# Patient Record
Sex: MALE | Race: WHITE
[De-identification: names, ages, dates, MRNs, and addresses within clinical notes are randomized per-mention and may not be internally consistent; named-entity substitution may affect disease eponyms.]

---

## 2021-02-16 ENCOUNTER — HOSPITAL ENCOUNTER (EMERGENCY)
Dept: HOSPITAL 63 - ER | Age: 46
Discharge: HOME | End: 2021-02-16
Payer: SELF-PAY

## 2021-02-16 DIAGNOSIS — M53.3: ICD-10-CM

## 2021-02-16 DIAGNOSIS — M54.5: ICD-10-CM
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
## 2021-02-16 DIAGNOSIS — M70.22: Primary | ICD-10-CM

## 2021-02-16 PROCEDURE — 72100 X-RAY EXAM L-S SPINE 2/3 VWS: CPT

## 2021-02-16 PROCEDURE — 99284 EMERGENCY DEPT VISIT MOD MDM: CPT

## 2021-02-16 PROCEDURE — 72170 X-RAY EXAM OF PELVIS: CPT

## 2021-02-16 PROCEDURE — 73080 X-RAY EXAM OF ELBOW: CPT
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
## 2021-02-16 NOTE — RAD
XR LUMBAR SPINE 2-3V 2/16/2021 9:00 AM



Indication: Back pain



COMPARISON: None available



TECHNIQUE: 3 views of the lumbar spine are provided.



Findings: 



Alignment of the lumbar spine is normal. Vertebral body heights are maintained. No acute fracture is 
identified.



Disc heights are maintained. No significant endplate degenerative changes are identified. There is no
 significant facet arthropathy. No significant osseous neuroforaminal stenosis or spinal canal stenos
is.



Nonobstructive bowel gas pattern. Visualized portions of the sacrum appear intact.



Impression:



No acute fracture or malalignment of the lumbar spine.



Electronically signed by: Park Nuñez MD (2/16/2021 3:59 PM) [id number]

## 2021-02-16 NOTE — RAD
AP VIEW OF THE PELVIS



Clinical indications: Back pain.



FINDINGS: There is mild degenerative spurring of the left femoral head and mild degenerative joint sp
ace narrowing of the left hip joint. No acute fracture dislocation or lytic process is seen.



IMPRESSION: Mild primary degenerative osteoarthritis of the left hip joint.



THREE-VIEW LEFT ELBOW STUDY



Clinical indications: Left elbow pain.



FINDINGS: No joint effusion is seen. No acute fracture or dislocation or lytic process is seen. No si
gnificant arthritic change is evident.



IMPRESSION: No significant osseous abnormality.



Electronically signed by: Jun Bha MD (2/16/2021 4:03 PM) [id number]

## 2021-02-16 NOTE — RAD
AP VIEW OF THE PELVIS



Clinical indications: Back pain.



FINDINGS: There is mild degenerative spurring of the left femoral head and mild degenerative joint sp
ace narrowing of the left hip joint. No acute fracture dislocation or lytic process is seen.



IMPRESSION: Mild primary degenerative osteoarthritis of the left hip joint.



THREE-VIEW LEFT ELBOW STUDY



Clinical indications: Left elbow pain.



FINDINGS: No joint effusion is seen. No acute fracture or dislocation or lytic process is seen. No si
gnificant arthritic change is evident.



IMPRESSION: No significant osseous abnormality.



Electronically signed by: Jun Bah MD (2/16/2021 4:03 PM) [id number]

## 2021-02-23 ENCOUNTER — HOSPITAL ENCOUNTER (EMERGENCY)
Dept: HOSPITAL 63 - ER | Age: 46
Discharge: HOME | End: 2021-02-23
Payer: COMMERCIAL

## 2021-02-23 VITALS
HEIGHT: 66 IN | BODY MASS INDEX: 24.09 KG/M2 | DIASTOLIC BLOOD PRESSURE: 81 MMHG | SYSTOLIC BLOOD PRESSURE: 143 MMHG | WEIGHT: 149.91 LBS
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
## 2021-02-23 DIAGNOSIS — T15.91XA: Primary | ICD-10-CM
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
## 2021-02-23 PROCEDURE — 65235 REMOVE FOREIGN BODY FROM EYE: CPT

## 2021-02-23 PROCEDURE — 99284 EMERGENCY DEPT VISIT MOD MDM: CPT

## 2021-02-23 RX ADMIN — TETRACAINE HYDROCHLORIDE ONE DROP: 5 SOLUTION OPHTHALMIC at 01:11

## 2021-02-23 RX ADMIN — FLUORESCEIN SODIUM ONE STRIP: 1 STRIP OPHTHALMIC at 01:12
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
